# Patient Record
Sex: MALE | Race: WHITE
[De-identification: names, ages, dates, MRNs, and addresses within clinical notes are randomized per-mention and may not be internally consistent; named-entity substitution may affect disease eponyms.]

---

## 2019-08-23 ENCOUNTER — HOSPITAL ENCOUNTER (EMERGENCY)
Dept: HOSPITAL 62 - ER | Age: 29
Discharge: HOME | End: 2019-08-23
Payer: SELF-PAY

## 2019-08-23 VITALS — DIASTOLIC BLOOD PRESSURE: 79 MMHG | SYSTOLIC BLOOD PRESSURE: 137 MMHG

## 2019-08-23 DIAGNOSIS — N50.819: Primary | ICD-10-CM

## 2019-08-23 DIAGNOSIS — Z20.2: ICD-10-CM

## 2019-08-23 DIAGNOSIS — F17.200: ICD-10-CM

## 2019-08-23 LAB
APPEARANCE UR: CLEAR
APTT PPP: YELLOW S
BILIRUB UR QL STRIP: NEGATIVE
CHLAM PCR: NOT DETECTED
GLUCOSE UR STRIP-MCNC: NEGATIVE MG/DL
KETONES UR STRIP-MCNC: (no result) MG/DL
NITRITE UR QL STRIP: NEGATIVE
PH UR STRIP: 5 [PH] (ref 5–9)
PROT UR STRIP-MCNC: NEGATIVE MG/DL
SP GR UR STRIP: 1.03
UROBILINOGEN UR-MCNC: 2 MG/DL (ref ?–2)

## 2019-08-23 PROCEDURE — 99284 EMERGENCY DEPT VISIT MOD MDM: CPT

## 2019-08-23 PROCEDURE — 76870 US EXAM SCROTUM: CPT

## 2019-08-23 PROCEDURE — 81001 URINALYSIS AUTO W/SCOPE: CPT

## 2019-08-23 PROCEDURE — 87491 CHLMYD TRACH DNA AMP PROBE: CPT

## 2019-08-23 PROCEDURE — 87591 N.GONORRHOEAE DNA AMP PROB: CPT

## 2019-08-23 PROCEDURE — 96372 THER/PROPH/DIAG INJ SC/IM: CPT

## 2019-08-23 NOTE — ER DOCUMENT REPORT
ED Medical Screen (RME)





- General


Chief Complaint: Testicular Pain


Stated Complaint: TESTICULAR PAIN


Time Seen by Provider: 08/23/19 16:31


Mode of Arrival: Ambulatory


Information source: Patient


Notes: 





29-year-old male presented to ED for complaint of bilateral testicular pain for 

2 months.  He states that he was seen in [place] and given a shot and told that 

he had a STD and given a prescription but he did not fill the prescription.  He 

states he is continued to have pain in bilateral testicles.  He states he does 

smoke a pack a day does not drink or do any drugs and is looking for work.  He 

is alert oriented respirations regular and unlabored speaking in full sentences.














I have greeted and performed a rapid initial assessment of this patient.  A 

comprehensive ED assessment and evaluation of the patient, analysis of test 

results and completion of medical decision making process will be conducted by 

an additional ED providers.


TRAVEL OUTSIDE OF THE [place] IN LAST 30 DAYS: No





- Related Data


Allergies/Adverse Reactions: 


                                        





No Known Allergies Allergy (Unverified 08/23/19 16:20)


   











Physical Exam





- Vital signs


Vitals: 





                                        











Temp Pulse Resp BP Pulse Ox


 


 98.2 F   98   20   150/93 H  97 


 


 08/23/19 16:14  08/23/19 16:14  08/23/19 16:14  08/23/19 16:14  08/23/19 16:14














Course





- Vital Signs


Vital signs: 





                                        











Temp Pulse Resp BP Pulse Ox


 


 98.2 F   98   20   150/93 H  97 


 


 08/23/19 16:14  08/23/19 16:14  08/23/19 16:14  08/23/19 16:14  08/23/19 16:14

## 2019-08-23 NOTE — RADIOLOGY REPORT (SQ)
EXAM DESCRIPTION:  U/S SCROTUM W/O DOPPLER



COMPLETED DATE/TIME:  8/23/2019 5:34 pm



REASON FOR STUDY:  bilateral testicular pain



COMPARISON:  None.



TECHNIQUE:  Static and realtime gray scale imaging of the scrotum and testes.  Selected color Doppler
 and spectral images recorded to document blood flow.



LIMITATIONS:  None.



FINDINGS:  RIGHT:

TESTICLE: Normal size. Normal echotexture.  Normal blood flow.  No mass.

EPIDIDYMIS: Normal.

HYDROCELE OR VARICOCELE: No.

HERNIA OR EXTRA-TESTICULAR MASS: No.

OTHER: No other significant finding.

LEFT:

TESTICLE: Normal size. Normal echotexture.  Normal blood flow.  No mass.

EPIDIDYMIS: Normal.

HYDROCELE OR VARICOCELE: No.

HERNIA OR EXTRA-TESTICULAR MASS: No.

OTHER: No other significant finding.



IMPRESSION:   NO EVIDENCE OF TESTICULAR MASS OR TORSION.



TECHNICAL DOCUMENTATION:  JOB ID:  [id number]

[id number]

 2011 [employer]- All Rights Reserved



Reading location - IP/workstation name: [email or web address]

## 2019-08-23 NOTE — ER DOCUMENT REPORT
ED General





- General


Chief Complaint: Testicular Pain


Stated Complaint: TESTICULAR PAIN


Time Seen by Provider: 08/23/19 16:31


Mode of Arrival: Ambulatory


Notes: 





Patient is a 29-year-old male presents to the emergency department for bilateral

testicular pain potential STD exposure.  Patient states he was seen in the 

emergency department in [place] approximately 2 months ago for generalized 

testicular swelling.  States he was given a shot of antibiotics but then was 

also given a prescription for which he never got filled.  States the generalized

redness and swelling in his testicle had gone down but he still continues with 

intermittent pain.  Patient's denying any penile discharge or dysuria.





Patient denies any trauma or injury to his testicles or penis.  Patient is also 

denying any sort of rash.


TRAVEL OUTSIDE OF THE [place] IN LAST 30 DAYS: No





- Related Data


Allergies/Adverse Reactions: 


                                        





No Known Allergies Allergy (Unverified 08/23/19 16:20)


   











Past Medical History





- General


Information source: Patient





- Social History


Smoking Status: Current Every Day Smoker


Chew tobacco use (# tins/day): No


Frequency of alcohol use: None


Drug Abuse: None


Family History: Reviewed & Not Pertinent


Patient has suicidal ideation: No


Patient has homicidal ideation: No


Renal/ Medical History: Denies: Hx Peritoneal Dialysis





Review of Systems





- Review of Systems


Constitutional: denies: Fever


EENT: No symptoms reported


Cardiovascular: No symptoms reported


Respiratory: No symptoms reported


Gastrointestinal: No symptoms reported


Genitourinary: See HPI


Male Genitourinary: See HPI


Musculoskeletal: No symptoms reported


Skin: See HPI


Hematologic/Lymphatic: No symptoms reported


Neurological/Psychological: No symptoms reported





Physical Exam





- Vital signs


Vitals: 





                                        











Temp Pulse Resp BP Pulse Ox


 


 98.2 F   98   20   150/93 H  97 


 


 08/23/19 16:14  08/23/19 16:14  08/23/19 16:14  08/23/19 16:14  08/23/19 16:14














- Notes


Notes: 





GENERAL: Alert, interacts well. No acute distress.


HEAD: Normocephalic, atraumatic.


EYES: Pupils equal, round, and reactive to light. Extraocular movements intact.


ENT: Oral mucosa moist, tongue midline. 


NECK: Full range of motion. Supple. Trachea midline.


LUNGS: Clear to auscultation bilaterally, no wheezes, rales, or rhonchi. No 

respiratory distress.


HEART: Regular rate and rhythm. No murmur


ABDOMEN: Soft, non-tender. Non-distended. Bowel sounds present in all 4 

quadrants.


EXTREMITIES: Moves all 4 extremities spontaneously. No edema, normal radial and 

dorsalis pedis pulses bilaterally. No cyanosis.


BACK: no cervical, thoracic, lumbar midline tenderness. No saddle anesthesia, 

normal distal neurovascular exam. 


NEUROLOGICAL: Alert and oriented x3. Normal speech. cranial nerves II through 

XII grossly intact 


PSYCH: Normal affect, normal mood.


SKIN: Warm, dry, normal turgor. No rashes or lesions noted.


Genitalia: Chaperone Maria D CASTELLANO, bilateral testicles descended nonerythematous, 

nonswollen, nontender on palpation.  Circumcised penis no active discharge at 

the meatus, no obvious lesions noted.








Course





- Re-evaluation


Re-evalutation: 





08/23/19 19:21


Patient's gonorrhea and Chlamydia testing is still pending.  Patient's 

ultrasound reveals no signs of torsion, hydrocele, epididymitis.


Patient was prophylactically treated for gonorrhea and chlamydia in the 

emergency department.  Discussed close follow-up with [facility], [facility].





At this time will discharge with return precautions and follow-up 

recommendations.  Verbal discharge instructions given a the bedside and 

opportunity for questions given. Medication warnings reviewed. Patient is in 

agreement with this plan and has verbalized understanding of return precautions 

and the need for primary care follow-up in the next 24-72 hours.





This medical record was dictated with voice recognizing software.  There may be 

grammatical, syntax errors that are unintended.





- Vital Signs


Vital signs: 





                                        











Temp Pulse Resp BP Pulse Ox


 


 98.2 F   98   20   150/93 H  97 


 


 08/23/19 16:14  08/23/19 16:14  08/23/19 16:14  08/23/19 16:14  08/23/19 16:14














- Laboratory


Laboratory results interpreted by me: 





                                        











  08/23/19





  18:37


 


Urine Ketones  TRACE H


 


Urine Urobilinogen  2.0 H


 


Urine Ascorbic Acid  40 H














Discharge





- Discharge


Clinical Impression: 


 Testicular pain, STD exposure





Condition: Stable


Disposition: HOME, SELF-CARE


Instructions:  Testicular Pain ([facility]), Gonorrhea ([facility]), Chlamydia ([facility])


Additional Instructions: 


As we discussed you have been seen and treated in the emergency department for 

your generalized chest regular pain and exposure to sexually transmitted 

disease.  You have been treated for gonorrhea and chlamydia.  Your ultrasound 

results of your testicle shows no abnormalities.  Is my recommendation that you 

tell all of your sexual partners they should also be tested and treated for 

sexually transmitted diseases.  Please follow-up with [facility], [facility] clinic in the next 24 to 48 hours.  These are clinics you can go to 

even if you are uninsured.  Please return to the emergency room for any further 

concerns.


Referrals: 


[facility] [Provider Group] - Follow up as needed


[facility] [Provider Group] - Follow up as needed

## 2019-12-13 ENCOUNTER — HOSPITAL ENCOUNTER (EMERGENCY)
Dept: HOSPITAL 62 - ER | Age: 29
Discharge: HOME | End: 2019-12-13
Payer: COMMERCIAL

## 2019-12-13 VITALS — SYSTOLIC BLOOD PRESSURE: 154 MMHG | DIASTOLIC BLOOD PRESSURE: 83 MMHG

## 2019-12-13 DIAGNOSIS — S80.212A: Primary | ICD-10-CM
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
## 2019-12-13 DIAGNOSIS — S80.211A: ICD-10-CM

## 2019-12-13 DIAGNOSIS — F17.200: ICD-10-CM

## 2019-12-13 DIAGNOSIS — R22.0: ICD-10-CM

## 2019-12-13 PROCEDURE — 99283 EMERGENCY DEPT VISIT LOW MDM: CPT
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
